# Patient Record
Sex: FEMALE | Race: ASIAN | NOT HISPANIC OR LATINO | URBAN - METROPOLITAN AREA
[De-identification: names, ages, dates, MRNs, and addresses within clinical notes are randomized per-mention and may not be internally consistent; named-entity substitution may affect disease eponyms.]

---

## 2017-01-01 ENCOUNTER — OUTPATIENT (OUTPATIENT)
Dept: OUTPATIENT SERVICES | Facility: HOSPITAL | Age: 82
LOS: 1 days | End: 2017-01-01
Payer: MEDICAID

## 2017-01-17 DIAGNOSIS — R69 ILLNESS, UNSPECIFIED: ICD-10-CM

## 2017-08-01 PROCEDURE — G9001: CPT

## 2018-05-17 ENCOUNTER — APPOINTMENT (OUTPATIENT)
Dept: PHARMACY | Facility: CLINIC | Age: 83
End: 2018-05-17

## 2018-07-08 ENCOUNTER — EMERGENCY (EMERGENCY)
Facility: HOSPITAL | Age: 83
LOS: 1 days | Discharge: HOME | End: 2018-07-08
Attending: EMERGENCY MEDICINE | Admitting: EMERGENCY MEDICINE
Payer: MEDICAID

## 2018-07-08 VITALS
SYSTOLIC BLOOD PRESSURE: 201 MMHG | OXYGEN SATURATION: 97 % | RESPIRATION RATE: 18 BRPM | HEART RATE: 98 BPM | DIASTOLIC BLOOD PRESSURE: 112 MMHG

## 2018-07-08 VITALS
SYSTOLIC BLOOD PRESSURE: 174 MMHG | HEART RATE: 88 BPM | RESPIRATION RATE: 16 BRPM | DIASTOLIC BLOOD PRESSURE: 86 MMHG | TEMPERATURE: 98 F | OXYGEN SATURATION: 100 %

## 2018-07-08 LAB
ALBUMIN SERPL ELPH-MCNC: 4.1 G/DL — SIGNIFICANT CHANGE UP (ref 3.3–5)
ALP SERPL-CCNC: 70 U/L — SIGNIFICANT CHANGE UP (ref 40–120)
ALT FLD-CCNC: 13 U/L — SIGNIFICANT CHANGE UP (ref 4–33)
APPEARANCE UR: SIGNIFICANT CHANGE UP
AST SERPL-CCNC: 19 U/L — SIGNIFICANT CHANGE UP (ref 4–32)
BACTERIA # UR AUTO: HIGH
BASOPHILS # BLD AUTO: 0.02 K/UL — SIGNIFICANT CHANGE UP (ref 0–0.2)
BASOPHILS NFR BLD AUTO: 0.4 % — SIGNIFICANT CHANGE UP (ref 0–2)
BILIRUB SERPL-MCNC: 0.4 MG/DL — SIGNIFICANT CHANGE UP (ref 0.2–1.2)
BILIRUB UR-MCNC: NEGATIVE — SIGNIFICANT CHANGE UP
BLOOD UR QL VISUAL: NEGATIVE — SIGNIFICANT CHANGE UP
BUN SERPL-MCNC: 15 MG/DL — SIGNIFICANT CHANGE UP (ref 7–23)
CALCIUM SERPL-MCNC: 9.4 MG/DL — SIGNIFICANT CHANGE UP (ref 8.4–10.5)
CHLORIDE SERPL-SCNC: 98 MMOL/L — SIGNIFICANT CHANGE UP (ref 98–107)
CO2 SERPL-SCNC: 25 MMOL/L — SIGNIFICANT CHANGE UP (ref 22–31)
COLOR SPEC: SIGNIFICANT CHANGE UP
CREAT SERPL-MCNC: 0.82 MG/DL — SIGNIFICANT CHANGE UP (ref 0.5–1.3)
EOSINOPHIL # BLD AUTO: 0.12 K/UL — SIGNIFICANT CHANGE UP (ref 0–0.5)
EOSINOPHIL NFR BLD AUTO: 2.2 % — SIGNIFICANT CHANGE UP (ref 0–6)
GLUCOSE SERPL-MCNC: 116 MG/DL — HIGH (ref 70–99)
GLUCOSE UR-MCNC: NEGATIVE — SIGNIFICANT CHANGE UP
HCT VFR BLD CALC: 44 % — SIGNIFICANT CHANGE UP (ref 34.5–45)
HGB BLD-MCNC: 14.8 G/DL — SIGNIFICANT CHANGE UP (ref 11.5–15.5)
IMM GRANULOCYTES # BLD AUTO: 0.02 # — SIGNIFICANT CHANGE UP
IMM GRANULOCYTES NFR BLD AUTO: 0.4 % — SIGNIFICANT CHANGE UP (ref 0–1.5)
KETONES UR-MCNC: NEGATIVE — SIGNIFICANT CHANGE UP
LEUKOCYTE ESTERASE UR-ACNC: HIGH
LYMPHOCYTES # BLD AUTO: 1.62 K/UL — SIGNIFICANT CHANGE UP (ref 1–3.3)
LYMPHOCYTES # BLD AUTO: 30 % — SIGNIFICANT CHANGE UP (ref 13–44)
MCHC RBC-ENTMCNC: 29.1 PG — SIGNIFICANT CHANGE UP (ref 27–34)
MCHC RBC-ENTMCNC: 33.6 % — SIGNIFICANT CHANGE UP (ref 32–36)
MCV RBC AUTO: 86.4 FL — SIGNIFICANT CHANGE UP (ref 80–100)
MONOCYTES # BLD AUTO: 0.58 K/UL — SIGNIFICANT CHANGE UP (ref 0–0.9)
MONOCYTES NFR BLD AUTO: 10.7 % — SIGNIFICANT CHANGE UP (ref 2–14)
MUCOUS THREADS # UR AUTO: SIGNIFICANT CHANGE UP
NEUTROPHILS # BLD AUTO: 3.04 K/UL — SIGNIFICANT CHANGE UP (ref 1.8–7.4)
NEUTROPHILS NFR BLD AUTO: 56.3 % — SIGNIFICANT CHANGE UP (ref 43–77)
NITRITE UR-MCNC: POSITIVE — HIGH
NON-SQ EPI CELLS # UR AUTO: 1 — SIGNIFICANT CHANGE UP
NRBC # FLD: 0 — SIGNIFICANT CHANGE UP
PH UR: 7 — SIGNIFICANT CHANGE UP (ref 4.6–8)
PLATELET # BLD AUTO: 192 K/UL — SIGNIFICANT CHANGE UP (ref 150–400)
PMV BLD: 8.4 FL — SIGNIFICANT CHANGE UP (ref 7–13)
POTASSIUM SERPL-MCNC: 4.2 MMOL/L — SIGNIFICANT CHANGE UP (ref 3.5–5.3)
POTASSIUM SERPL-SCNC: 4.2 MMOL/L — SIGNIFICANT CHANGE UP (ref 3.5–5.3)
PROT SERPL-MCNC: 7.2 G/DL — SIGNIFICANT CHANGE UP (ref 6–8.3)
PROT UR-MCNC: NEGATIVE MG/DL — SIGNIFICANT CHANGE UP
RBC # BLD: 5.09 M/UL — SIGNIFICANT CHANGE UP (ref 3.8–5.2)
RBC # FLD: 12.7 % — SIGNIFICANT CHANGE UP (ref 10.3–14.5)
RBC CASTS # UR COMP ASSIST: SIGNIFICANT CHANGE UP (ref 0–?)
SODIUM SERPL-SCNC: 134 MMOL/L — LOW (ref 135–145)
SP GR SPEC: 1.01 — SIGNIFICANT CHANGE UP (ref 1–1.04)
SQUAMOUS # UR AUTO: SIGNIFICANT CHANGE UP
TROPONIN T, HIGH SENSITIVITY: 21 NG/L — SIGNIFICANT CHANGE UP (ref ?–14)
TROPONIN T, HIGH SENSITIVITY: 22 NG/L — SIGNIFICANT CHANGE UP (ref ?–14)
UROBILINOGEN FLD QL: NORMAL MG/DL — SIGNIFICANT CHANGE UP
WBC # BLD: 5.4 K/UL — SIGNIFICANT CHANGE UP (ref 3.8–10.5)
WBC # FLD AUTO: 5.4 K/UL — SIGNIFICANT CHANGE UP (ref 3.8–10.5)
WBC UR QL: SIGNIFICANT CHANGE UP (ref 0–?)

## 2018-07-08 PROCEDURE — 93010 ELECTROCARDIOGRAM REPORT: CPT

## 2018-07-08 PROCEDURE — 70450 CT HEAD/BRAIN W/O DYE: CPT | Mod: 26

## 2018-07-08 PROCEDURE — 71046 X-RAY EXAM CHEST 2 VIEWS: CPT | Mod: 26

## 2018-07-08 PROCEDURE — 99285 EMERGENCY DEPT VISIT HI MDM: CPT | Mod: 25

## 2018-07-08 RX ORDER — METOPROLOL TARTRATE 50 MG
50 TABLET ORAL ONCE
Qty: 0 | Refills: 0 | Status: COMPLETED | OUTPATIENT
Start: 2018-07-08 | End: 2018-07-08

## 2018-07-08 RX ORDER — SODIUM CHLORIDE 9 MG/ML
1000 INJECTION INTRAMUSCULAR; INTRAVENOUS; SUBCUTANEOUS ONCE
Qty: 0 | Refills: 0 | Status: COMPLETED | OUTPATIENT
Start: 2018-07-08 | End: 2018-07-08

## 2018-07-08 RX ADMIN — Medication 50 MILLIGRAM(S): at 11:16

## 2018-07-08 RX ADMIN — SODIUM CHLORIDE 1000 MILLILITER(S): 9 INJECTION INTRAMUSCULAR; INTRAVENOUS; SUBCUTANEOUS at 11:16

## 2018-07-08 NOTE — ED PROVIDER NOTE - ATTENDING CONTRIBUTION TO CARE
86F h/o HTN presents with dizziness.  States since this morning she has difficulty walking, reports a sensation of disequilibrium.  No sensation of vertigo or lightheadedness. Symptoms only occur with walking, do not occur with change in position or sitting up from lying down. No vision change, no n/v, no weakness, no confusion. Reports over the last few days she has had some intermittent nausea, no vomiting, none today.  Also has had elevated BP at home.  Recent changes in BP meds, was switched from atenolol to metoprolol, last week was taking a lower dose of metoprolol.  No CP/SOB.  On exam well appearing, nad, PERRL, EOMI, no nystagmus, lungs clear, rrr, abd soft, 2+ pulses, no edema, no rash, speech clear, 5/5 motor, sensation intact, no dysmetria, slow gait, at baseline per family. CT head negative for hemorrhage. Trop indeterminant, hx atypical for ACS, no EKG changes, rpt trop unchanged, given Heart Score 2, will plan for o/p followup. Neuro consult to eval need for advanced imaging, thought to be more lightheadedness, no recommendation for MRI.  Patient now at baseline, asymptomatic on walking. Will d/c home.

## 2018-07-08 NOTE — CONSULT NOTE ADULT - SUBJECTIVE AND OBJECTIVE BOX
HPI:  Patient is an 86 year old female with PMHx of HTN (metoprolol 50 - took 100 mg yesterday, losartan 100), who presents with lightheadedness for 2 days when she stands up. She woke up day of presentation at 7:30 AM and it had worsened so she came to the ED. Took BP at home and it was in the 160s SBP (is usually in 130s). She took 100 of metoprolol yesterday but hoem dose is usually 50 mg, losartan has been taken consistently. No other medication changes. She denies headache, vision changes, chest pain, SOB, nausea, vomiting, diarrhea, trauma, falls.   She describes the sensation as dizziness and unsteadiness upon standing, when she is supine or sitting, is asymptomatic.     MEDICATIONS  (STANDING):  metoprolol tartrate 50 milliGRAM(s) Oral Once  sodium chloride 0.9% Bolus 1000 milliLiter(s) IV Bolus once    PAST MEDICAL & SURGICAL HISTORY:  HTN (hypertension)  No significant past surgical history    FAMILY HISTORY:    Allergies  No Known Allergies    SHx - No smoking, No ETOH, No drug abuse    Review of Systems:  CONSTITUTIONAL:  No weight loss, fever, chills, weakness or fatigue.  HEENT:  Eyes:  No visual loss, blurred vision, double vision or yellow sclerae. Ears, Nose, Throat:  No hearing loss, sneezing, congestion, runny nose or sore throat.  CARDIOVASCULAR:  No chest pain, chest pressure or chest discomfort. No palpitations or edema.  GASTROINTESTINAL:  No anorexia, nausea, vomiting or diarrhea. No abdominal pain or blood.  NEUROLOGICAL: See HPI  MUSCULOSKELETAL:  No muscle, back pain, joint pain or stiffness.  PSYCHIATRIC:  No history of depression or anxiety.    Vital Signs Last 24 Hrs  T(C): 36.8 (08 Jul 2018 08:16), Max: 36.8 (08 Jul 2018 08:16)  T(F): 98.3 (08 Jul 2018 08:16), Max: 98.3 (08 Jul 2018 08:16)  HR: 92 (08 Jul 2018 09:04) (88 - 92)  BP: 187/97 (08 Jul 2018 09:04) (174/86 - 187/97)  BP(mean): --  RR: 18 (08 Jul 2018 09:04) (16 - 18)  SpO2: 97% (08 Jul 2018 09:04) (97% - 100%)    General Exam:   General appearance: No acute distress                 Neurological Exam:  Mental Status: Orientated to self, date and place.    No dysarthria, aphasia or neglect.      Cranial Nerves: CN I - not tested.  PERRL, EOMI, VFF, no nystagmus or diplopia.  No APD.    Fundi not visualized bilaterally.    No facial asymmetry.     Motor:   Tone: normal.                  Strength:     [] Lower extremity                       HF          KE          KF        DF         PF                                               R        5/5        5/5        5/5       5/5       5/5                                               L         5/5        5/5       5/5       5/5        5/5  Pronator drift: none                 Dysmetria: BL NL FTN  No truncal ataxia.    Tremor: No resting, postural or action tremor.  No myoclonus.    Toes flexor bilaterally  Gait: slightly widened, short swing phase. appropriate for age, is consistent with how she usually walks per grandson.     Other:  07-08  134<L>  |  98  |  15  ----------------------------<  116<H>  4.2   |  25  |  0.82  Ca    9.4      08 Jul 2018 09:00  TPro  7.2  /  Alb  4.1  /  TBili  0.4  /  DBili  x   /  AST  19  /  ALT  13  /  AlkPhos  70  07-08               14.8   5.40  )-----------( 192      ( 08 Jul 2018 09:00 )             44.0     Radiology  CT: negative for bleed

## 2018-07-08 NOTE — CONSULT NOTE ADULT - ASSESSMENT
Patient is an 86 year old female with PMHx of HTN who presented to the ED from home after feeling dizzy and unsteady gait upon standing. On exam the unsteadiness has resolved and is back to her baseline as per grandson. No change in vision, no nystagmus.     Plan  resume BP medication at usual dose (metoprolol 50, losartan 100mg)  check orthostatics.    thank you for the consult    D/w Dr John

## 2018-07-08 NOTE — ED ADULT NURSE NOTE - OBJECTIVE STATEMENT
pt received to 2, aox3, pt [place], pt c/o dizziness x a few days.  pt family reports that pt's BP meds have been recently adjusted.  denies pain, pt on tele monitor, v/s as noted, SL placed, labs sent MD at bedside, awaiting further orders will jocelyn

## 2018-07-08 NOTE — ED PROVIDER NOTE - MEDICAL DECISION MAKING DETAILS
87 yo F w/ dysequilibrium/lightheadedness x 2 days; will work up for ACS, focal infection, intracranial processes; if workup negative will consider CDU for MRI

## 2018-07-08 NOTE — ED ADULT TRIAGE NOTE - CHIEF COMPLAINT QUOTE
pt reports dizziness since yesterday. sl nausea. denies weakness to extremities,denies visual disturbance. reports bp more elevated than usual.  report balance

## 2018-07-08 NOTE — ED PROVIDER NOTE - PROGRESS NOTE DETAILS
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

## 2018-07-08 NOTE — ED PROVIDER NOTE - PHYSICAL EXAMINATION
*GEN:   in no acute distress, AOx3    ///    *EYES:   pupils equally round and reactive to light, extra-occular movements intact    ///    *HEENT:   airway patent, moist mucosal membranes    ///    *CV:   regular rate and rhythm    ///    *RESP:   clear to auscultation bilaterally, non-labored    ///    *ABD:   soft, non-tender    ///    *:   no cva/flank tenderness    ///    *MSK:   no MSK tenderness or limited ROM    ///    *SKIN:   dry, intact    ///    *NEURO:   AOx3, cranial nerves intact throughout, no focal loss of sensation, no pronator drift, finger/nose normal, difficulty standing, ambulating slowly and w/ difficulty

## 2018-07-09 LAB — SPECIMEN SOURCE: SIGNIFICANT CHANGE UP

## 2018-07-10 LAB — BACTERIA UR CULT: SIGNIFICANT CHANGE UP

## 2018-07-11 NOTE — ED POST DISCHARGE NOTE - DETAILS
Spoke with pt daughter Rukhsana. Mother hard of hearing and cannot get on the phone. Daughter states mom denies dysuria, hematuria, increased frequency of urination., abdominal/back pain. Pt has appt to see PCP tomorrow.  I recommend urinalysis and urine culture be repeated with a clean catch. Pt and pt daughter understands and agrees.

## 2018-08-31 ENCOUNTER — APPOINTMENT (OUTPATIENT)
Dept: OPHTHALMOLOGY | Facility: CLINIC | Age: 83
End: 2018-08-31
Payer: MEDICAID

## 2018-08-31 DIAGNOSIS — H04.123 DRY EYE SYNDROME OF BILATERAL LACRIMAL GLANDS: ICD-10-CM

## 2018-08-31 PROCEDURE — 92015 DETERMINE REFRACTIVE STATE: CPT

## 2018-08-31 PROCEDURE — 92004 COMPRE OPH EXAM NEW PT 1/>: CPT

## 2018-08-31 RX ORDER — POLYVINYL ALCOHOL 14 MG/ML
1.4 SOLUTION/ DROPS OPHTHALMIC
Qty: 1 | Refills: 5 | Status: ACTIVE | COMMUNITY
Start: 2018-08-31 | End: 1900-01-01

## 2018-11-09 ENCOUNTER — OUTPATIENT (OUTPATIENT)
Dept: OUTPATIENT SERVICES | Facility: HOSPITAL | Age: 83
LOS: 1 days | End: 2018-11-09
Payer: MEDICAID

## 2018-11-09 ENCOUNTER — APPOINTMENT (OUTPATIENT)
Dept: RADIOLOGY | Facility: IMAGING CENTER | Age: 83
End: 2018-11-09
Payer: MEDICAID

## 2018-11-09 DIAGNOSIS — Z00.8 ENCOUNTER FOR OTHER GENERAL EXAMINATION: ICD-10-CM

## 2018-11-09 PROCEDURE — 72084 X-RAY EXAM ENTIRE SPI 6/> VW: CPT | Mod: 26

## 2018-11-09 PROCEDURE — 72110 X-RAY EXAM L-2 SPINE 4/>VWS: CPT

## 2018-11-09 PROCEDURE — 72070 X-RAY EXAM THORAC SPINE 2VWS: CPT

## 2018-11-14 DIAGNOSIS — M41.9 SCOLIOSIS, UNSPECIFIED: ICD-10-CM

## 2018-11-14 DIAGNOSIS — M43.17 SPONDYLOLISTHESIS, LUMBOSACRAL REGION: ICD-10-CM

## 2018-11-14 DIAGNOSIS — M43.16 SPONDYLOLISTHESIS, LUMBAR REGION: ICD-10-CM

## 2018-11-14 DIAGNOSIS — M47.816 SPONDYLOSIS WITHOUT MYELOPATHY OR RADICULOPATHY, LUMBAR REGION: ICD-10-CM

## 2021-12-29 NOTE — ED ADULT NURSE NOTE - NS ED NOTE ABUSE SUSPICION NEGLECT YN
[address] [facility] ENCOUNTER          NURSE PRACTITIONER NOTE     Date of evaluation: 12/28/2021    Chief Complaint     Positive For Covid-19 (tested covid pos yesterday, started with confusion and chest pain yesterday. states \"i just dont feel like my head is right\") and Chest Pain (\"i started having chest pain at home and i started looking things up and it said it could be really dangerous so i should come to the emergency room\")    History of Present Illness     Da Mccoy is a 39 y.o. female with past medical history of schizophrenia, who tested positive for Covid yesterday, with symptoms starting on 12/21 or 12/22 who presents to the emergency department with complaints of left-sided chest pain and mild confusion. She states that she has had symptoms including nasal congestion, body aches, intermittent fevers, and today started having intermittent migrating left-sided chest pain. Describes the pain as pressure-like. Not worse with deep inspiration. No leg pain, swelling, history of DVT/PE. No significant shortness of breath. When asked to clarify what she means by confused, she just states that she does not think that her head is right. It is not painful. She feels slightly foggy. She denies any auditory visual hallucinations, or problems with her memory. She denies any numbness or weakness anywhere. Denies any dizziness or lightheadedness. Denies vision changes. With the exception of the above, there are no aggravating or alleviating factors.     Review of Systems     Pertinent positive and negative findings as documented above in HPI, otherwise all other systems were reviewed and negative     Past Medical, Surgical, Family, and Social History     Medical History:   Past Medical History:   Diagnosis Date    Schizophrenia ([facility] [address]     Seasonal allergies        Surgical History:   Past Surgical History:   Procedure Laterality Date    DILATION AND CURETTAGE OF UTERUS  INDUCED          Social History: She reports that she has never smoked. She has never used smokeless tobacco. She reports current alcohol use. She reports that she does not use drugs. Family History: Her family history is not on file. Medications     Discharge Medication List as of 2021  9:19 PM      CONTINUE these medications which have NOT CHANGED    Details   naproxen (NAPROSYN) 500 MG tablet Take 1 tablet by mouth 2 times daily as needed for Pain, Disp-20 tablet, R-0Print      cyclobenzaprine (FLEXERIL) 10 MG tablet Take 1 tablet by mouth 3 times daily as needed for Muscle spasms, Disp-15 tablet, R-0Print             Allergies     Allergies: Allergies as of 2021 - Fully Reviewed 2021   Allergen Reaction Noted    Methotrexate Anaphylaxis, Shortness Of Breath, and Swelling 2014    Other Shortness Of Breath and Swelling 2013        Physical Exam     INITIAL VITALS: BP: 95/69, Temp: 97.1 °F (36.2 °C), Pulse: 70, Resp: 18, SpO2: 99 %     General: 39 y.o. female in no apparent distress, well developed, well nourished, non-toxic appearance. HEENT: Atraumatic, normocephalic. EOMs intact. Neck:  Full range of motion. Chest/pulm: Respiratory rate normal. Speaks in complete sentences, no respiratory distress, lungs CTA bilaterally, no wheezes, rales, rhonchi. Cardiovascular: Heart rate normal. RRR, no murmurs, rubs, gallops     Abdomen: No gross distension. Soft, non-tender, non-peritoneal.     : Deferred. Musculoskeletal: No obvious joint deformity. Ambulates without difficulty. Neuro: A&O x4. GCS 15. CN II-XII grossly intact. No focal neuro deficits. Speech clear and appropriate. Gait normal. Moves all extremities spontaneously. Normal muscle strength and tone. Normal finger-to-nose coordination. Negative Romberg. No pronator drift    Skin: Warm, dry. No obvious rashes, petechiae, or purpura.      Psych: Appropriate mood and affect, normal interaction. Diagnostic Results       RADIOLOGY:  XR CHEST (2 VW)   Final Result      Clear lungs. Normal cardiomediastinal silhouette.           LABS:   Results for orders placed or performed during the hospital encounter of 12/28/21   Wet prep, genital    Specimen: Vaginal   Result Value Ref Range    Trichomonas Prep None Seen     Yeast, Wet Prep None Seen     Clue Cells, Wet Prep None Seen     WBC, Wet Prep 1+     RBC, Wet Prep <1+     Epi Cells 3+     Bacteria 2+     Source Wet Prep Vaginal    Troponin   Result Value Ref Range    Troponin <0.01 <0.01 ng/mL   CBC Auto Differential   Result Value Ref Range    WBC 4.1 4.0 - 11.0 K/uL    RBC 4.40 4.00 - 5.20 M/uL    Hemoglobin 12.9 12.0 - 16.0 g/dL    Hematocrit 38.4 36.0 - 48.0 %    MCV 87.3 80.0 - 100.0 fL    MCH 29.4 26.0 - 34.0 pg    MCHC 33.6 31.0 - 36.0 g/dL    RDW 12.9 12.4 - 15.4 %    Platelets 143 793 - 257 K/uL    MPV 8.2 5.0 - 10.5 fL    Neutrophils % 52.1 %    Lymphocytes % 38.1 %    Monocytes % 8.7 %    Eosinophils % 0.7 %    Basophils % 0.4 %    Neutrophils Absolute 2.1 1.7 - 7.7 K/uL    Lymphocytes Absolute 1.5 1.0 - 5.1 K/uL    Monocytes Absolute 0.4 0.0 - 1.3 K/uL    Eosinophils Absolute 0.0 0.0 - 0.6 K/uL    Basophils Absolute 0.0 0.0 - 0.2 K/uL   HCG Qualitative, Serum   Result Value Ref Range    hCG Qual Negative Detects HCG level >10 MIU/mL   Comprehensive Metabolic Panel   Result Value Ref Range    Sodium 134 (L) 136 - 145 mmol/L    Potassium 4.3 3.5 - 5.1 mmol/L    Chloride 101 99 - 110 mmol/L    CO2 24 21 - 32 mmol/L    Anion Gap 9 3 - 16    Glucose 103 (H) 70 - 99 mg/dL    BUN 7 7 - 20 mg/dL    CREATININE 0.8 0.6 - 1.1 mg/dL    GFR Non-African American >60 >60    GFR African American >60 >60    Calcium 8.3 8.3 - 10.6 mg/dL    Total Protein 7.6 6.4 - 8.2 g/dL    Albumin 3.9 3.4 - 5.0 g/dL    Albumin/Globulin Ratio 1.1 1.1 - 2.2    Total Bilirubin <0.2 0.0 - 1.0 mg/dL    Alkaline Phosphatase 54 40 - 129 U/L    ALT 8 (L) 10 - 40 U/L AST 24 15 - 37 U/L   Lipase   Result Value Ref Range    Lipase 35.0 13.0 - 60.0 U/L   Urinalysis Reflex to Culture    Specimen: Urine, clean catch   Result Value Ref Range    Color, UA Yellow Straw/Yellow    Clarity, UA Clear Clear    Glucose, Ur Negative Negative mg/dL    Bilirubin Urine Negative Negative    Ketones, Urine Negative Negative mg/dL    Specific Gravity, UA 1.025 1.005 - 1.030    Blood, Urine Negative Negative    pH, UA 6.0 5.0 - 8.0    Protein, UA Negative Negative mg/dL    Urobilinogen, Urine 0.2 <2.0 E.U./dL    Nitrite, Urine POSITIVE (A) Negative    Leukocyte Esterase, Urine LARGE (A) Negative    Microscopic Examination YES     Urine Type Voided     Urine Reflex to Culture Yes    Microscopic Urinalysis   Result Value Ref Range    Mucus, UA Rare (A) None Seen /LPF    WBC, UA 21-50 (A) 0 - 5 /HPF    RBC, UA 0-2 0 - 4 /HPF    Epithelial Cells, UA 6-10 (A) 0 - 5 /HPF    Bacteria, UA 2+ (A) None Seen /HPF   EKG 12 Lead   Result Value Ref Range    Ventricular Rate 71 BPM    Atrial Rate 71 BPM    P-R Interval 146 ms    QRS Duration 82 ms    Q-T Interval 390 ms    QTc Calculation (Bazett) 423 ms    P Axis 67 degrees    R Axis 56 degrees    T Axis 59 degrees    Diagnosis       EKG performed in ER and to be interpreted by ER physician. Confirmed by MD, ER ([id number]),  Adilene BRISENO)NATALIE ([id number]) on 12/28/2021 3:05:02 PM       RECENT VITALS:  BP: 120/89, Temp: 98.2 °F (36.8 °C), Pulse: 70, Resp: 18, SpO2: 98 %     Procedures     None     ED Course     Nursing Notes, Past Medical Hx, Past Surgical Hx, Social Hx, Allergies, and Family Hx were reviewed.     The patient was given the following medications:  Orders Placed This Encounter   Medications    cephALEXin (KEFLEX) 500 MG capsule     Sig: Take 1 capsule by mouth 2 times daily for 7 days     Dispense:  14 capsule     Refill:  0            CONSULTS:  None    MEDICAL DECISION MAKING / Tabitha Saha / PLAN     Briefly, Alejandro Prado is a 39 y.o. female who presented to the emergency department with complaints of viral symptoms, chest pain, and confusion. On presentation, is well-appearing, no acute distress. Is afebrile with stable vital signs except for initially soft blood pressure of 95/69, improved to 120/89 on repeat. Physical exam shows clear lungs bilaterally, no reproducible chest wall pain, no lower extremity swelling or asymmetry. Her neuro exam is nonfocal.  She is alert, oriented, and answers questions appropriately. She does not seem to be attending to internal stimuli. She has no red flag symptoms concerning for subarachnoid hemorrhage, subdural hemorrhage, encephalitis, meningitis. CT head was deferred in the setting of normal neurologic exam, vague symptoms. Laboratory work-up was unremarkable. EKG nonischemic. Chest x-ray showing clear lungs bilaterally. Patient had UA completed, which incidentally showed nitrate positive urine, 20-50 WBCs. Went back into discussed with patient. No urinary symptoms. Has been having a little bit of discharge. Wet prep was obtained which showed no evidence of BV, trichomoniasis, yeast.  She is not concern about STDs and GC was deferred. We will send urine culture. We will start her on Keflex given micro and nitrate positive. Regarding chest pain, low suspicion for ACS, PE. Is possible that her chest discomfort is due to coughing. Will give recommendations for symptomatic relief at home and close return precautions    At this point in time, patient is stable for discharge. Patient was given strict return precautions as outlined in the AVS. Patient was agreeable and understanding to this plan of care. Prior to discharge, patient was ambulatory and PO tolerant. The patient was evaluated by myself and the ED Attending Physician, Dr. Michael Roach. All management and disposition plans were discussed and agreed upon. Clinical Impression     1. COVID    2.  Acute cystitis without hematuria        Disposition DC    DISCHARGE MEDICATIONS:  Discharge Medication List as of 12/28/2021  9:19 PM      START taking these medications    Details   cephALEXin (KEFLEX) 500 MG capsule Take 1 capsule by mouth 2 times daily for 7 days, Disp-14 capsule, R-0Print             PATIENT REFERRED TO:  [facility]  [address] [facility] [address] [phone]    As needed, If symptoms worsen      JESUS Toussaint - CNP, CNP  Department of Emergency Medicine     JESUS Toussaint - [place]  12/28/21 [id number] No